# Patient Record
Sex: MALE | Race: WHITE
[De-identification: names, ages, dates, MRNs, and addresses within clinical notes are randomized per-mention and may not be internally consistent; named-entity substitution may affect disease eponyms.]

---

## 2019-08-27 ENCOUNTER — HOSPITAL ENCOUNTER (EMERGENCY)
Dept: HOSPITAL 97 - ER | Age: 24
Discharge: HOME | End: 2019-08-27
Payer: COMMERCIAL

## 2019-08-27 DIAGNOSIS — N50.82: Primary | ICD-10-CM

## 2019-08-27 LAB — UA COMPLETE W REFLEX CULTURE PNL UR: (no result)

## 2019-08-27 PROCEDURE — 76870 US EXAM SCROTUM: CPT

## 2019-08-27 PROCEDURE — 81015 MICROSCOPIC EXAM OF URINE: CPT

## 2019-08-27 PROCEDURE — 81003 URINALYSIS AUTO W/O SCOPE: CPT

## 2019-08-27 NOTE — ER
Nurse's Notes                                                                                     

 [facility]                                                                 

Name: Kevon Dunn                                                                             

Age: 24 yrs                                                                                       

Sex: Male                                                                                         

: 1995                                                                                   

MRN: [id number]                                                                                   

Arrival Date: 2019                                                                          

Time: 20:30                                                                                       

Account#: [id number]                                                                            

Bed 26                                                                                            

Private MD:                                                                                       

Diagnosis: SCROTAL PAIN                                                                           

                                                                                                  

Presentation:                                                                                     

                                                                                             

20:39 Presenting complaint: Patient states: I have been having left sided testicular          la1 

      discomfort for the last 4-6 months. Transition of care: patient was not received from       

      another setting of care. Onset of symptoms was 2019. Risk Assessment: Do you     

      want to hurt yourself or someone else? Patient reports no desire to harm self or            

      others. Initial Sepsis Screen: Does the patient meet any 2 criteria? No. Patient's          

      initial sepsis screen is negative. Does the patient have a suspected source of              

      infection? No. Patient's initial sepsis screen is negative. Care prior to arrival: None.    

20:39 Method Of Arrival: Ambulatory                                                           la1 

20:39 Acuity: CHER 5                                                                           la1 

                                                                                                  

Triage Assessment:                                                                                

22:11 Pain: Complains of pain in groin.                                                       rv  

                                                                                                  

Historical:                                                                                       

- Allergies:                                                                                      

20:40 No Known Allergies;                                                                     la1 

- PMHx:                                                                                           

20:40 None;                                                                                   la1 

                                                                                                  

- Immunization history:: Adult Immunizations up to date.                                          

- Social history:: Smoking status: Patient/guardian denies using tobacco.                         

- Ebola Screening: : No symptoms or risks identified at this time.                                

                                                                                                  

                                                                                                  

Screenin:10 Abuse screen: Denies threats or abuse. Denies injuries from another. Nutritional        rv  

      screening: No deficits noted. Tuberculosis screening: No symptoms or risk factors           

      identified. Fall Risk None identified.                                                      

                                                                                                  

Assessment:                                                                                       

22:12 General: Appears in no apparent distress. comfortable, Behavior is calm, cooperative.   rv  

      Pain: Complains of pain in groin. Neuro: Level of Consciousness is awake, alert, obeys      

      commands, Oriented to person, place, time, situation. Cardiovascular: Patient's skin is     

      warm and dry. Respiratory: Airway is patent. GI: No signs and/or symptoms were reported     

      involving the gastrointestinal system. : No signs and/or symptoms were reported           

      regarding the genitourinary system. :. EENT: No signs and/or symptoms were reported       

      regarding the EENT system. Derm: Skin is intact. Musculoskeletal: No signs and/or           

      symptoms reported regarding the musculoskeletal system.                                     

                                                                                                  

Vital Signs:                                                                                      

20:40  / 72; Pulse 89; Resp 16; Temp 97.6; Pulse Ox 98% on R/A; Weight 77.11 kg; Height la1 

      5 ft. 10 in. (177.80 cm);                                                                   

22:20  / 70; Pulse 91; Resp 17 S; Temp 98.2(O); Pulse Ox 99% on R/A;                    ca1 

20:40 Body Mass Index 24.39 (77.11 kg, 177.80 cm)                                             la1 

                                                                                                  

ED Course:                                                                                        

20:30 Patient arrived in ED.                                                                  mr  

20:40 Triage completed.                                                                       la1 

20:40 Arm band placed on right wrist.                                                         la1 

21:32 Tony Suárez MD is Attending Physician.                                              gs  

21:33 Kay Segovia, RN is Primary Nurse.                                                      ca1 

22:10 Patient has correct armband on for positive identification. Bed in low position. Call   rv  

      light in reach. Side rails up X 1. Pulse ox on. NIBP on.                                    

22:22 Yadiel Nieves MD is Referral Physician.                                              gs  

22:23 No provider procedures requiring assistance completed. Patient did not have IV access   ca1 

      during this emergency room visit.                                                           

                                                                                                  

Administered Medications:                                                                         

No medications were administered                                                                  

                                                                                                  

                                                                                                  

Outcome:                                                                                          

22:22 Discharge ordered by MD.                                                                gs  

22:23 Discharged to home ambulatory.                                                          ca1 

22:23 Condition: stable                                                                           

22:23 Discharge instructions given to patient, Instructed on discharge instructions, follow       

      up and referral plans. Demonstrated understanding of instructions, follow-up care.          

22:28 Patient left the ED.                                                                    rv  

                                                                                                  

Signatures:                                                                                       

Bela Ash Lee, RN RN   la1                                                  

Tony Suárez MD MD                                                      

Paolo Burns RN RN   rv                                                   

Kay Segovia RN RN   ca1                                                  

                                                                                                  

**************************************************************************************************

## 2019-08-27 NOTE — EDPHYS
Physician Documentation                                                                           

 [facility]                                                                 

Name: Kevon Dunn                                                                             

Age: 24 yrs                                                                                       

Sex: Male                                                                                         

: 1995                                                                                   

MRN: [id number]                                                                                   

Arrival Date: 2019                                                                          

Time: 20:30                                                                                       

Account#: [id number]                                                                            

Bed 26                                                                                            

Private MD:                                                                                       

ED Physician Tony Suárez                                                                       

HPI:                                                                                              

                                                                                             

22:19 This 24 yrs old  Male presents to ER via Ambulatory with complaints of         gs  

      Testicular Pain.                                                                            

22:19 The patient presents with scrotal pain, of the left side, without swelling, without     gs  

      erythema. Onset: The symptoms/episode began/occurred 5 month(s) ago. Modifying factors:     

      The symptoms are alleviated by nothing, the symptoms are aggravated by nothing.             

      Associated signs and symptoms: Pertinent negatives: abdominal pain, constipation,           

      dysuria, discharge. Severity of symptoms: At their worst the symptoms were moderate, in     

      the emergency department the symptoms have improved, markedly. The patient has              

      experienced similar episodes in the past, multiple times. The patient has not recently      

      seen a physician.                                                                           

                                                                                                  

Historical:                                                                                       

- Allergies:                                                                                      

20:40 No Known Allergies;                                                                     la1 

- PMHx:                                                                                           

20:40 None;                                                                                   la1 

                                                                                                  

- Immunization history:: Adult Immunizations up to date.                                          

- Social history:: Smoking status: Patient/guardian denies using tobacco.                         

- Ebola Screening: : No symptoms or risks identified at this time.                                

                                                                                                  

                                                                                                  

ROS:                                                                                              

22:19 All other systems are negative.                                                         gs  

                                                                                                  

Exam:                                                                                             

22:19 Head/Face:  Normocephalic, atraumatic. Eyes:  Pupils equal round and reactive to light, gs  

      extra-ocular motions intact.  Lids and lashes normal.  Conjunctiva and sclera are           

      non-icteric and not injected.  Cornea within normal limits.  Periorbital areas with no      

      swelling, redness, or edema. ENT:  Nares patent. No nasal discharge, no septal              

      abnormalities noted.  Tympanic membranes are normal and external auditory canals are        

      clear.  Oropharynx with no redness, swelling, or masses, exudates, or evidence of           

      obstruction, uvula midline.  Mucous membranes moist. Neck:  Trachea midline, no             

      thyromegaly or masses palpated, and no cervical lymphadenopathy.  Supple, full range of     

      motion without nuchal rigidity, or vertebral point tenderness.  No Meningismus.             

      Chest/axilla:  Normal chest wall appearance and motion.  Nontender with no deformity.       

      No lesions are appreciated. Cardiovascular:  Regular rate and rhythm with a normal S1       

      and S2.  No gallops, murmurs, or rubs.  Normal PMI, no JVD.  No pulse deficits.             

      Respiratory:  Lungs have equal breath sounds bilaterally, clear to auscultation and         

      percussion.  No rales, rhonchi or wheezes noted.  No increased work of breathing, no        

      retractions or nasal flaring. Abdomen/GI:  Soft, non-tender, with normal bowel sounds.      

      No distension or tympany.  No guarding or rebound.  No evidence of tenderness               

      throughout. Skin:  Warm, dry with normal turgor.  Normal color with no rashes, no           

      lesions, and no evidence of cellulitis. MS/ Extremity:  Pulses equal, no cyanosis.          

      Neurovascular intact.  Full, normal range of motion. Neuro:  Awake and alert, GCS 15,       

      oriented to person, place, time, and situation.  Cranial nerves II-XII grossly intact.      

      Motor strength 5/5 in all extremities.  Sensory grossly intact.  Cerebellar exam            

      normal.  Normal gait.                                                                       

22:19 Constitutional: The patient appears alert, awake.                                           

22:19 : Male external genitalia: normal, no swelling, no tenderness.                            

                                                                                                  

Vital Signs:                                                                                      

20:40  / 72; Pulse 89; Resp 16; Temp 97.6; Pulse Ox 98% on R/A; Weight 77.11 kg; Height la1 

      5 ft. 10 in. (177.80 cm);                                                                   

22:20  / 70; Pulse 91; Resp 17 S; Temp 98.2(O); Pulse Ox 99% on R/A;                    ca1 

20:40 Body Mass Index 24.39 (77.11 kg, 177.80 cm)                                             la1 

                                                                                                  

MDM:                                                                                              

21:37 Patient medically screened.                                                               

22:19 Differential diagnosis: prostatitis, urethritis, TORSION EPIDIMYTIS. Data reviewed:       

      vital signs, nurses notes. Counseling: I had a detailed discussion with the patient         

      and/or guardian regarding: the historical points, exam findings, and any diagnostic         

      results supporting the discharge/admit diagnosis, lab results, radiology results, the       

      need for outpatient follow up, a urologist. Response to treatment: the patient's            

      symptoms have mildly improved after treatment, and as a result, I will discharge            

      patient.                                                                                    

                                                                                                  

                                                                                             

21:38 Order name: Urine Dipstick-Ancillary (obtain specimen); Complete Time: 22:09              

                                                                                                  

Administered Medications:                                                                         

No medications were administered                                                                  

                                                                                                  

                                                                                                  

Disposition:                                                                                      

19 22:22 Discharged to Home. Impression: SCROTAL PAIN.                                      

- Condition is Stable.                                                                            

- Discharge Instructions: Testicular Self-Exam.                                                   

                                                                                                  

- Medication Reconciliation Form, Thank You Letter, Antibiotic Education, Prescription            

  Opioid Use form.                                                                                

- Follow up: Private Physician; When: 2 - 3 days; Reason: Re-evaluation by your                   

  physician. Follow up: Yadiel Nieves MD; When: 2 - 3 days; Reason: Re-evaluation by           

  your physician.                                                                                 

                                                                                                  

                                                                                                  

                                                                                                  

Signatures:                                                                                       

Lg Patel RN                         RN   la1                                                  

Tony Suárez MD MD                                                      

Paolo Burns RN                    RN   rv                                                   

                                                                                                  

Corrections: (The following items were deleted from the chart)                                    

22:22 22:22 2019 22:22 Discharged to Home. Impression: SCROTAL PAIN. Condition is       gs  

      Stable. Forms are Medication Reconciliation Form, Thank You Letter, Antibiotic              

      Education, Prescription Opioid Use. Follow up: Private Physician; When: 2 - 3 days;         

      Reason: Re-evaluation by your physician.                                                  

22:28 22:22 2019 22:22 Discharged to Home. Impression: SCROTAL PAIN. Condition is       rv  

      Stable. Discharge Instructions: Testicular Self-Exam. Forms are Medication                  

      Reconciliation Form, Thank You Letter, Antibiotic Education, Prescription Opioid Use.       

      Follow up: Private Physician; When: 2 - 3 days; Reason: Re-evaluation by your               

      physician. Follow up: Yadiel Nieves; When: 2 - 3 days; Reason: Re-evaluation by your       

      physician.                                                                                

                                                                                                  

**************************************************************************************************

## 2019-08-28 NOTE — RAD REPORT
EXAM DESCRIPTION:  US - Scrotum Testicles - 8/27/2019 9:54 pm

 

CLINICAL HISTORY:  Left Testicular pain

 

COMPARISON:  None

 

FINDINGS:  Right testicle measures 4.4 x 1.9 x 3 centimeters. Echotexture is homogeneous. Normal bloo
d flow

 

Left testicle measures 3.8 x 2 x 3.2 centimeters. Echotexture is homogeneous. Normal blood flow

 

The epididymides are normal in size and echotexture. Normal blood flow is seen.

 

Small left hydrocele

 

IMPRESSION:  Small left hydrocele. Otherwise unremarkable exam